# Patient Record
Sex: MALE | Race: BLACK OR AFRICAN AMERICAN | NOT HISPANIC OR LATINO | URBAN - METROPOLITAN AREA
[De-identification: names, ages, dates, MRNs, and addresses within clinical notes are randomized per-mention and may not be internally consistent; named-entity substitution may affect disease eponyms.]

---

## 2023-01-17 ENCOUNTER — OFFICE VISIT (OUTPATIENT)
Dept: FAMILY MEDICINE CLINIC | Facility: CLINIC | Age: 38
End: 2023-01-17

## 2023-01-17 VITALS
HEIGHT: 72 IN | OXYGEN SATURATION: 98 % | TEMPERATURE: 97.6 F | SYSTOLIC BLOOD PRESSURE: 124 MMHG | HEART RATE: 52 BPM | BODY MASS INDEX: 24.92 KG/M2 | WEIGHT: 184 LBS | DIASTOLIC BLOOD PRESSURE: 82 MMHG

## 2023-01-17 DIAGNOSIS — Z00.8 EXAM FOR POPULATION SURVEY: ICD-10-CM

## 2023-01-17 DIAGNOSIS — Z23 ENCOUNTER FOR IMMUNIZATION: ICD-10-CM

## 2023-01-17 DIAGNOSIS — M10.9 GOUT, UNSPECIFIED CAUSE, UNSPECIFIED CHRONICITY, UNSPECIFIED SITE: ICD-10-CM

## 2023-01-17 DIAGNOSIS — E78.00 HYPERCHOLESTEROLEMIA: ICD-10-CM

## 2023-01-17 DIAGNOSIS — Z00.00 ENCOUNTER FOR ANNUAL PHYSICAL EXAM: Primary | ICD-10-CM

## 2023-01-17 DIAGNOSIS — R53.83 FATIGUE, UNSPECIFIED TYPE: ICD-10-CM

## 2023-01-17 DIAGNOSIS — Z80.0 FAMILY HISTORY OF COLON CANCER IN MOTHER: ICD-10-CM

## 2023-01-17 NOTE — ASSESSMENT & PLAN NOTE
CPE done  Tdap given  Had J&J COVID vaccine  Declined flu shot  Will check labs and colo  Pt advised to follow a well balanced, heart healthy diet and to exercise on a regular basis  BP good  Not a smoker

## 2023-01-17 NOTE — PATIENT INSTRUCTIONS
Wellness Visit for Adults   AMBULATORY CARE:   A wellness visit  is when you see your healthcare provider to get screened for health problems  Your healthcare provider will also give you advice on how to stay healthy  Write down your questions so you remember to ask them  Ask your healthcare provider how often you should have a wellness visit  What happens at a wellness visit:  Your healthcare provider will ask about your health, and your family history of health problems  This includes high blood pressure, heart disease, and cancer  He or she will ask if you have symptoms that concern you, if you smoke, and about your mood  You may also be asked about your intake of medicines, supplements, food, and alcohol  Any of the following may be done: Your weight  will be checked  Your height may also be checked so your body mass index (BMI) can be calculated  Your BMI shows if you are at a healthy weight  Your blood pressure  and heart rate will be checked  Your temperature may also be checked  Blood and urine tests  may be done  Blood tests may be done to check your cholesterol levels  Abnormal cholesterol levels increase your risk for heart disease and stroke  You may also need a blood or urine test to check for diabetes if you are at increased risk  Urine tests may be done to look for signs of an infection or kidney disease  A physical exam  includes checking your heartbeat and lungs with a stethoscope  Your healthcare provider may also check your skin to look for sun damage  Screening tests  may be recommended  A screening test is done to check for diseases that may not cause symptoms  The screening tests you may need depend on your age, gender, family history, and lifestyle habits  For example, colorectal screening may be recommended if you are 48years old or older  Screening tests you need if you are a woman:   A Pap smear  is used to screen for cervical cancer   Pap smears are usually done every 3 to 5 years depending on your age  You may need them more often if you have had abnormal Pap smear test results in the past  Ask your healthcare provider how often you should have a Pap smear  A mammogram  is an x-ray of your breasts to screen for breast cancer  Experts recommend mammograms every 2 years starting at age 48 years  You may need a mammogram at age 52 years or younger if you have an increased risk for breast cancer  Talk to your healthcare provider about when you should start having mammograms and how often you need them  Vaccines you may need:   Get an influenza vaccine  every year  The influenza vaccine protects you from the flu  Several types of viruses cause the flu  The viruses change over time, so new vaccines are made each year  Get a tetanus-diphtheria (Td) booster vaccine  every 10 years  This vaccine protects you against tetanus and diphtheria  Tetanus is a severe infection that may cause painful muscle spasms and lockjaw  Diphtheria is a severe bacterial infection that causes a thick covering in the back of your mouth and throat  Get a human papillomavirus (HPV) vaccine  if you are female and aged 23 to 32 or male 23 to 24 and never received it  This vaccine protects you from HPV infection  HPV is the most common infection spread by sexual contact  HPV may also cause vaginal, penile, and anal cancers  Get a pneumococcal vaccine  if you are aged 72 years or older  The pneumococcal vaccine is an injection given to protect you from pneumococcal disease  Pneumococcal disease is an infection caused by pneumococcal bacteria  The infection may cause pneumonia, meningitis, or an ear infection  Get a shingles vaccine  if you are 60 or older, even if you have had shingles before  The shingles vaccine is an injection to protect you from the varicella-zoster virus  This is the same virus that causes chickenpox   Shingles is a painful rash that develops in people who had chickenpox or have been exposed to the virus  How to eat healthy:  My Plate is a model for planning healthy meals  It shows the types and amounts of foods that should go on your plate  Fruits and vegetables make up about half of your plate, and grains and protein make up the other half  A serving of dairy is included on the side of your plate  The amount of calories and serving sizes you need depends on your age, gender, weight, and height  Examples of healthy foods are listed below:  Eat a variety of vegetables  such as dark green, red, and orange vegetables  You can also include canned vegetables low in sodium (salt) and frozen vegetables without added butter or sauces  Eat a variety of fresh fruits , canned fruit in 100% juice, frozen fruit, and dried fruit  Include whole grains  At least half of the grains you eat should be whole grains  Examples include whole-wheat bread, wheat pasta, brown rice, and whole-grain cereals such as oatmeal     Eat a variety of protein foods such as seafood (fish and shellfish), lean meat, and poultry without skin (turkey and chicken)  Examples of lean meats include pork leg, shoulder, or tenderloin, and beef round, sirloin, tenderloin, and extra lean ground beef  Other protein foods include eggs and egg substitutes, beans, peas, soy products, nuts, and seeds  Choose low-fat dairy products such as skim or 1% milk or low-fat yogurt, cheese, and cottage cheese  Limit unhealthy fats  such as butter, hard margarine, and shortening  Exercise:  Exercise at least 30 minutes per day on most days of the week  Some examples of exercise include walking, biking, dancing, and swimming  You can also fit in more physical activity by taking the stairs instead of the elevator or parking farther away from stores  Include muscle strengthening activities 2 days each week  Regular exercise provides many health benefits   It helps you manage your weight, and decreases your risk for type 2 diabetes, heart disease, stroke, and high blood pressure  Exercise can also help improve your mood  Ask your healthcare provider about the best exercise plan for you  General health and safety guidelines:   Do not smoke  Nicotine and other chemicals in cigarettes and cigars can cause lung damage  Ask your healthcare provider for information if you currently smoke and need help to quit  E-cigarettes or smokeless tobacco still contain nicotine  Talk to your healthcare provider before you use these products  Limit alcohol  A drink of alcohol is 12 ounces of beer, 5 ounces of wine, or 1½ ounces of liquor  Lose weight, if needed  Being overweight increases your risk of certain health conditions  These include heart disease, high blood pressure, type 2 diabetes, and certain types of cancer  Protect your skin  Do not sunbathe or use tanning beds  Use sunscreen with a SPF 15 or higher  Apply sunscreen at least 15 minutes before you go outside  Reapply sunscreen every 2 hours  Wear protective clothing, hats, and sunglasses when you are outside  Drive safely  Always wear your seatbelt  Make sure everyone in your car wears a seatbelt  A seatbelt can save your life if you are in an accident  Do not use your cell phone when you are driving  This could distract you and cause an accident  Pull over if you need to make a call or send a text message  Practice safe sex  Use latex condoms if are sexually active and have more than one partner  Your healthcare provider may recommend screening tests for sexually transmitted infections (STIs)  Wear helmets, lifejackets, and protective gear  Always wear a helmet when you ride a bike or motorcycle, go skiing, or play sports that could cause a head injury  Wear protective equipment when you play sports  Wear a lifejacket when you are on a boat or doing water sports      © Copyright [employer] 2022 Information is for End User's use only and may not be sold, redistributed or otherwise used for commercial purposes  All illustrations and images included in CareNotes® are the copyrighted property of A D A M , Inc  or Mundo Murphy  The above information is an  only  It is not intended as medical advice for individual conditions or treatments  Talk to your doctor, nurse or pharmacist before following any medical regimen to see if it is safe and effective for you

## 2023-01-17 NOTE — PROGRESS NOTES
Depression Screening and Follow-up Plan: Patient was screened for depression during today's encounter  They screened negative with a PHQ-2 score of 0  Assessment/Plan:         Problem List Items Addressed This Visit        Other    Hypercholesterolemia     Recheck level  Relevant Orders    Lipid panel    Gout     No recent episodes  Will check uric acid level  Relevant Orders    Uric acid    Fatigue     Will check labs  Relevant Orders    CBC and differential    Comprehensive metabolic panel    Testosterone, free, total    Family history of colon cancer in mother     Will refer for colo  Relevant Orders    Ambulatory referral for colonoscopy    Encounter for annual physical exam - Primary     CPE done  Tdap given  Had J&J COVID vaccine  Declined flu shot  Will check labs and colo  Pt advised to follow a well balanced, heart healthy diet and to exercise on a regular basis  BP good  Not a smoker  Other Visit Diagnoses     Encounter for immunization        Relevant Orders    TDAP VACCINE GREATER THAN OR EQUAL TO 8YO IM    Exam for population survey        Relevant Orders    CBC and differential    Comprehensive metabolic panel    Lipid panel    Testosterone, free, total    Uric acid            Subjective:      Patient ID: Mary Skelton is a 44 y o  male  Pt here for physical  Has hx of HL and tgs  Pt also has hx of gout in the past and uric acid was high  No recent episodes and cut out red meat  Pt exercises  Not a smoker  No ETOH use  Unsure of last Tdap  Mom had colon CA and pt wants to get colo  Doesn't get flu shot  Had COVID vaccine J &J  Pt also feels tired a lot         The following portions of the patient's history were reviewed and updated as appropriate:   Past Medical History:  He has no past medical history on file ,  _______________________________________________________________________  Medical Problems:  does not have any pertinent problems on file ,  _______________________________________________________________________  Past Surgical History:   has a past surgical history that includes Anterior cruciate ligament repair  ,  _______________________________________________________________________  Family History:  family history includes Colon cancer in his mother; Hypertension in his mother and sister  ,  _______________________________________________________________________  Social History:   reports that he has never smoked  He has never used smokeless tobacco  He reports that he does not drink alcohol and does not use drugs  ,  _______________________________________________________________________  Allergies:  has No Known Allergies     _______________________________________________________________________  No current outpatient medications on file  No current facility-administered medications for this visit      _______________________________________________________________________  Review of Systems   Constitutional: Positive for fatigue  Negative for activity change, appetite change and unexpected weight change  HENT: Negative for congestion, ear pain, rhinorrhea, sore throat and trouble swallowing  Eyes: Negative for pain, discharge and visual disturbance  Respiratory: Negative for cough, shortness of breath and wheezing  Cardiovascular: Negative for chest pain, palpitations and leg swelling  Gastrointestinal: Negative for abdominal pain, constipation, diarrhea, nausea and vomiting  Endocrine: Negative for polydipsia, polyphagia and polyuria  Genitourinary: Negative for difficulty urinating and hematuria  Musculoskeletal: Negative for arthralgias, back pain, gait problem, myalgias and neck pain  Skin: Negative for color change and rash  Neurological: Negative for dizziness, weakness and headaches  Hematological: Negative for adenopathy  Does not bruise/bleed easily     Psychiatric/Behavioral: Negative for dysphoric mood and sleep disturbance  The patient is not nervous/anxious  Objective:  Vitals:    01/17/23 1426 01/17/23 1457   BP: 124/82    BP Location: Left arm    Patient Position: Sitting    Cuff Size: Standard    Pulse: (!) 52    Temp: 97 6 °F (36 4 °C)    TempSrc: Temporal    SpO2: 98%    Weight: 83 5 kg (184 lb)    Height:  6' (1 829 m)     Body mass index is 24 95 kg/m²  Physical Exam  Vitals and nursing note reviewed  Constitutional:       Appearance: Normal appearance  He is well-developed and normal weight  HENT:      Head: Normocephalic and atraumatic  Right Ear: Tympanic membrane, ear canal and external ear normal       Left Ear: Tympanic membrane, ear canal and external ear normal       Nose: Nose normal       Mouth/Throat:      Mouth: Mucous membranes are moist       Pharynx: Oropharynx is clear  No posterior oropharyngeal erythema  Eyes:      Conjunctiva/sclera: Conjunctivae normal       Pupils: Pupils are equal, round, and reactive to light  Neck:      Thyroid: No thyromegaly  Cardiovascular:      Rate and Rhythm: Normal rate and regular rhythm  Heart sounds: Normal heart sounds  No murmur heard  Pulmonary:      Effort: Pulmonary effort is normal       Breath sounds: Normal breath sounds  No wheezing or rales  Abdominal:      General: Bowel sounds are normal  There is no distension  Palpations: Abdomen is soft  There is no mass  Tenderness: There is no abdominal tenderness  Musculoskeletal:         General: No deformity  Normal range of motion  Cervical back: Normal range of motion and neck supple  Right lower leg: No edema  Left lower leg: No edema  Lymphadenopathy:      Cervical: No cervical adenopathy  Skin:     General: Skin is warm and dry  Capillary Refill: Capillary refill takes less than 2 seconds  Findings: No erythema or rash  Neurological:      General: No focal deficit present        Mental Status: He is alert and oriented to person, place, and time  Mental status is at baseline  Cranial Nerves: No cranial nerve deficit  Sensory: No sensory deficit  Motor: No abnormal muscle tone  Coordination: Coordination normal    Psychiatric:         Mood and Affect: Mood normal          Behavior: Behavior normal          Thought Content:  Thought content normal          Judgment: Judgment normal

## 2023-01-18 ENCOUNTER — APPOINTMENT (OUTPATIENT)
Dept: LAB | Age: 38
End: 2023-01-18

## 2023-01-18 DIAGNOSIS — M10.9 GOUT, UNSPECIFIED CAUSE, UNSPECIFIED CHRONICITY, UNSPECIFIED SITE: ICD-10-CM

## 2023-01-18 DIAGNOSIS — E78.00 HYPERCHOLESTEROLEMIA: ICD-10-CM

## 2023-01-18 DIAGNOSIS — R89.9 ABNORMAL LABORATORY TEST: Primary | ICD-10-CM

## 2023-01-18 DIAGNOSIS — R53.83 FATIGUE, UNSPECIFIED TYPE: ICD-10-CM

## 2023-01-18 DIAGNOSIS — Z00.8 EXAM FOR POPULATION SURVEY: ICD-10-CM

## 2023-01-18 LAB
ALBUMIN SERPL BCP-MCNC: 4.1 G/DL (ref 3.5–5)
ALP SERPL-CCNC: 69 U/L (ref 46–116)
ALT SERPL W P-5'-P-CCNC: 39 U/L (ref 12–78)
ANION GAP SERPL CALCULATED.3IONS-SCNC: 4 MMOL/L (ref 4–13)
AST SERPL W P-5'-P-CCNC: 34 U/L (ref 5–45)
BASOPHILS # BLD AUTO: 0.03 THOUSANDS/ÂΜL (ref 0–0.1)
BASOPHILS NFR BLD AUTO: 1 % (ref 0–1)
BILIRUB SERPL-MCNC: 0.5 MG/DL (ref 0.2–1)
BUN SERPL-MCNC: 16 MG/DL (ref 5–25)
CALCIUM SERPL-MCNC: 9.5 MG/DL (ref 8.3–10.1)
CHLORIDE SERPL-SCNC: 105 MMOL/L (ref 96–108)
CHOLEST SERPL-MCNC: 199 MG/DL
CO2 SERPL-SCNC: 29 MMOL/L (ref 21–32)
CREAT SERPL-MCNC: 1.04 MG/DL (ref 0.6–1.3)
EOSINOPHIL # BLD AUTO: 0.08 THOUSAND/ÂΜL (ref 0–0.61)
EOSINOPHIL NFR BLD AUTO: 2 % (ref 0–6)
ERYTHROCYTE [DISTWIDTH] IN BLOOD BY AUTOMATED COUNT: 12.3 % (ref 11.6–15.1)
GFR SERPL CREATININE-BSD FRML MDRD: 90 ML/MIN/1.73SQ M
GLUCOSE P FAST SERPL-MCNC: 81 MG/DL (ref 65–99)
HCT VFR BLD AUTO: 43.9 % (ref 36.5–49.3)
HDLC SERPL-MCNC: 43 MG/DL
HGB BLD-MCNC: 14.7 G/DL (ref 12–17)
IMM GRANULOCYTES # BLD AUTO: 0.01 THOUSAND/UL (ref 0–0.2)
IMM GRANULOCYTES NFR BLD AUTO: 0 % (ref 0–2)
LDLC SERPL CALC-MCNC: 132 MG/DL (ref 0–100)
LYMPHOCYTES # BLD AUTO: 1.61 THOUSANDS/ÂΜL (ref 0.6–4.47)
LYMPHOCYTES NFR BLD AUTO: 49 % (ref 14–44)
MCH RBC QN AUTO: 31.4 PG (ref 26.8–34.3)
MCHC RBC AUTO-ENTMCNC: 33.5 G/DL (ref 31.4–37.4)
MCV RBC AUTO: 94 FL (ref 82–98)
MONOCYTES # BLD AUTO: 0.42 THOUSAND/ÂΜL (ref 0.17–1.22)
MONOCYTES NFR BLD AUTO: 13 % (ref 4–12)
NEUTROPHILS # BLD AUTO: 1.17 THOUSANDS/ÂΜL (ref 1.85–7.62)
NEUTS SEG NFR BLD AUTO: 35 % (ref 43–75)
NONHDLC SERPL-MCNC: 156 MG/DL
NRBC BLD AUTO-RTO: 0 /100 WBCS
PLATELET # BLD AUTO: 160 THOUSANDS/UL (ref 149–390)
PMV BLD AUTO: 13 FL (ref 8.9–12.7)
POTASSIUM SERPL-SCNC: 4.4 MMOL/L (ref 3.5–5.3)
PROT SERPL-MCNC: 7.7 G/DL (ref 6.4–8.4)
RBC # BLD AUTO: 4.68 MILLION/UL (ref 3.88–5.62)
SODIUM SERPL-SCNC: 138 MMOL/L (ref 135–147)
TRIGL SERPL-MCNC: 121 MG/DL
URATE SERPL-MCNC: 6.5 MG/DL (ref 3.5–8.5)
WBC # BLD AUTO: 3.32 THOUSAND/UL (ref 4.31–10.16)

## 2023-01-19 LAB
TESTOST FREE SERPL-MCNC: 10.5 PG/ML (ref 8.7–25.1)
TESTOST SERPL-MCNC: 553 NG/DL (ref 264–916)

## 2023-02-01 ENCOUNTER — TELEPHONE (OUTPATIENT)
Dept: GASTROENTEROLOGY | Facility: CLINIC | Age: 38
End: 2023-02-01

## 2023-02-01 ENCOUNTER — PREP FOR PROCEDURE (OUTPATIENT)
Dept: GASTROENTEROLOGY | Facility: CLINIC | Age: 38
End: 2023-02-01

## 2023-02-01 DIAGNOSIS — Z12.11 COLON CANCER SCREENING: Primary | ICD-10-CM

## 2023-02-01 DIAGNOSIS — Z12.11 COLON CANCER SCREENING: ICD-10-CM

## 2023-02-01 DIAGNOSIS — Z80.0 FAMILY HISTORY OF COLON CANCER IN MOTHER: Primary | ICD-10-CM

## 2023-02-01 NOTE — TELEPHONE ENCOUNTER
02/01/23  Screened by: Estrella Shields MA    Referring Provider Dr Barnett      Pre- Screening: Body mass index is 24 95 kg/m²  Has patient been referred for a routine screening Colonoscopy? yes  Is the patient between 39-70 years old? no      Previous Colonoscopy no   If yes:    Date:     Facility:     Reason:       SCHEDULING STAFF: If the patient is between 45yrs-49yrs, please advise patient to confirm benefits/coverage with their insurance company for a routine screening colonoscopy, some insurance carriers will only cover at [address] or older  If the patient is over 66years old, please schedule an office visit  Does the patient want to see a Gastroenterologist prior to their procedure OR are they having any GI symptoms? no    Has the patient been hospitalized or had abdominal surgery in the past 6 months? no    Does the patient use supplemental oxygen? no    Does the patient take Coumadin, Lovenox, Plavix, Elliquis, Xarelto, or other blood thinning medication? no    Has the patient had a stroke, cardiac event, or stent placed in the past year? no    SCHEDULING STAFF: If patient answers NO to above questions, then schedule procedure  If patient answers YES to above questions, then schedule office appointment  If patient is between 45yrs - 49yrs, please advise patient that we will have to confirm benefits & coverage with their insurance company for a routine screening colonoscopy          Scheduled date of colonoscopy (as of today): 2/17/23  Physician performing colonoscopy: Eliz Sheehan  Location of colonoscopy: [facility] [address] [place] [address]  Bowel prep reviewed with patient: Golytely  Instructions reviewed with patient by: Nancy/felipe  Clearances: N/A

## 2023-02-16 ENCOUNTER — TELEPHONE (OUTPATIENT)
Dept: GASTROENTEROLOGY | Facility: AMBULARY SURGERY CENTER | Age: 38
End: 2023-02-16

## 2023-02-16 NOTE — TELEPHONE ENCOUNTER
Pt would like to reschedule, call back # [phone]      Scheduled date of colonoscopy (as of today): 2/17/23  Physician performing colonoscopy: Noa Dennis  Location of colonoscopy: [facility] [address] [place] [address]  Bowel prep reviewed with patient: Golytely  Instructions reviewed with patient by: Nancy/felipe  Clearances: N/A

## 2023-02-23 ENCOUNTER — ANESTHESIA EVENT (OUTPATIENT)
Dept: ANESTHESIOLOGY | Facility: AMBULATORY SURGERY CENTER | Age: 38
End: 2023-02-23

## 2023-02-23 ENCOUNTER — ANESTHESIA (OUTPATIENT)
Dept: ANESTHESIOLOGY | Facility: AMBULATORY SURGERY CENTER | Age: 38
End: 2023-02-23

## 2023-02-27 ENCOUNTER — TELEPHONE (OUTPATIENT)
Dept: GASTROENTEROLOGY | Facility: CLINIC | Age: 38
End: 2023-02-27

## 2023-02-27 NOTE — TELEPHONE ENCOUNTER
lmom confirming pt's colonoscopy scheduled on 3/9/23 at [facility] with Dr Jl Collazo  Informed [facility] would be calling 1-2 days prior with the arrival time  Informed of clear liquid diet day prior as well as the bowel cleansing preparation  Informed would need a  the day of the procedure due to being under sedation  I informed pt that I would be emailing him the instructions for the Miralax w/ dulcolax prep which is all otc  I asked pt to please call back if does not receive or if has any questions

## 2023-03-14 ENCOUNTER — TELEPHONE (OUTPATIENT)
Dept: GASTROENTEROLOGY | Facility: AMBULATORY SURGERY CENTER | Age: 38
End: 2023-03-14

## 2023-03-14 NOTE — TELEPHONE ENCOUNTER
Patient reports he missed a call from the office but called to confirm he will be there at 1000 for his procedure tomorrow

## 2023-03-15 ENCOUNTER — HOSPITAL ENCOUNTER (OUTPATIENT)
Dept: GASTROENTEROLOGY | Facility: AMBULATORY SURGERY CENTER | Age: 38
Discharge: HOME | End: 2023-03-15
Attending: STUDENT IN AN ORGANIZED HEALTH CARE EDUCATION/TRAINING PROGRAM

## 2023-03-15 ENCOUNTER — ANESTHESIA (OUTPATIENT)
Dept: GASTROENTEROLOGY | Facility: AMBULATORY SURGERY CENTER | Age: 38
End: 2023-03-15

## 2023-03-15 ENCOUNTER — ANESTHESIA EVENT (OUTPATIENT)
Dept: GASTROENTEROLOGY | Facility: AMBULATORY SURGERY CENTER | Age: 38
End: 2023-03-15

## 2023-03-15 VITALS
SYSTOLIC BLOOD PRESSURE: 128 MMHG | OXYGEN SATURATION: 100 % | BODY MASS INDEX: 24.92 KG/M2 | TEMPERATURE: 97.3 F | RESPIRATION RATE: 18 BRPM | DIASTOLIC BLOOD PRESSURE: 83 MMHG | HEART RATE: 57 BPM | HEIGHT: 72 IN | WEIGHT: 184 LBS

## 2023-03-15 DIAGNOSIS — Z12.11 COLON CANCER SCREENING: ICD-10-CM

## 2023-03-15 RX ORDER — PROPOFOL 10 MG/ML
INJECTION, EMULSION INTRAVENOUS AS NEEDED
Status: DISCONTINUED | OUTPATIENT
Start: 2023-03-15 | End: 2023-03-15

## 2023-03-15 RX ORDER — SODIUM CHLORIDE, SODIUM LACTATE, POTASSIUM CHLORIDE, CALCIUM CHLORIDE 600; 310; 30; 20 MG/100ML; MG/100ML; MG/100ML; MG/100ML
INJECTION, SOLUTION INTRAVENOUS CONTINUOUS PRN
Status: DISCONTINUED | OUTPATIENT
Start: 2023-03-15 | End: 2023-03-15

## 2023-03-15 RX ADMIN — PROPOFOL 30 MG: 10 INJECTION, EMULSION INTRAVENOUS at 11:09

## 2023-03-15 RX ADMIN — PROPOFOL 30 MG: 10 INJECTION, EMULSION INTRAVENOUS at 11:16

## 2023-03-15 RX ADMIN — PROPOFOL 30 MG: 10 INJECTION, EMULSION INTRAVENOUS at 11:18

## 2023-03-15 RX ADMIN — PROPOFOL 50 MG: 10 INJECTION, EMULSION INTRAVENOUS at 10:59

## 2023-03-15 RX ADMIN — PROPOFOL 30 MG: 10 INJECTION, EMULSION INTRAVENOUS at 11:07

## 2023-03-15 RX ADMIN — PROPOFOL 30 MG: 10 INJECTION, EMULSION INTRAVENOUS at 11:04

## 2023-03-15 RX ADMIN — SODIUM CHLORIDE, SODIUM LACTATE, POTASSIUM CHLORIDE, CALCIUM CHLORIDE: 600; 310; 30; 20 INJECTION, SOLUTION INTRAVENOUS at 10:54

## 2023-03-15 RX ADMIN — PROPOFOL 30 MG: 10 INJECTION, EMULSION INTRAVENOUS at 11:20

## 2023-03-15 RX ADMIN — PROPOFOL 30 MG: 10 INJECTION, EMULSION INTRAVENOUS at 11:01

## 2023-03-15 RX ADMIN — PROPOFOL 150 MG: 10 INJECTION, EMULSION INTRAVENOUS at 10:57

## 2023-03-15 RX ADMIN — PROPOFOL 30 MG: 10 INJECTION, EMULSION INTRAVENOUS at 11:13

## 2023-03-15 NOTE — H&P
History and Physical -  [facility]  Kurtis Mariano 44 y o  male MRN: [id number]                  HPI: Kurtis Mariano is a 44y o  year old male who presents for screening colonoscopy, family history of colon cancer, high risk      REVIEW OF SYSTEMS: Per the HPI, and otherwise unremarkable  Historical Information   History reviewed  No pertinent past medical history  Past Surgical History:   Procedure Laterality Date   • ANTERIOR CRUCIATE LIGAMENT REPAIR Right      Social History   Social History     Substance and Sexual Activity   Alcohol Use Never     Social History     Substance and Sexual Activity   Drug Use Never     Social History     Tobacco Use   Smoking Status Never   Smokeless Tobacco Never     Family History   Problem Relation Age of Onset   • Colon cancer Mother    • Hypertension Mother    • Hypertension Sister        Meds/Allergies     (Not in a hospital admission)      No Known Allergies    Objective     /59   Pulse (!) 53   Temp (!) 97 3 °F (36 3 °C) (Skin)   Resp 18   Ht 6' (1 829 m)   Wt 83 5 kg (184 lb)   SpO2 99%   BMI 24 95 kg/m²       PHYSICAL EXAM    Gen: NAD  CV: RRR  CHEST: Clear  ABD: soft, NT/ND  EXT: no edema      ASSESSMENT/PLAN:  This is a 44y o  year old male here for colonoscopy, and he is stable and optimized for his procedure

## 2023-03-15 NOTE — ANESTHESIA PREPROCEDURE EVALUATION
Procedure:  COLONOSCOPY    Relevant Problems   CARDIO   (+) Hypercholesterolemia      MUSCULOSKELETAL   (+) Gout      Other   (+) Encounter for annual physical exam   (+) Family history of colon cancer in mother   (+) Fatigue      Lab Results   Component Value Date    SODIUM 138 01/18/2023    K 4 4 01/18/2023     01/18/2023    CO2 29 01/18/2023    AGAP 4 01/18/2023    BUN 16 01/18/2023    CREATININE 1 04 01/18/2023    GLUF 81 01/18/2023    CALCIUM 9 5 01/18/2023    AST 34 01/18/2023    ALT 39 01/18/2023    ALKPHOS 69 01/18/2023    TP 7 7 01/18/2023    TBILI 0 50 01/18/2023    EGFR 90 01/18/2023     Lab Results   Component Value Date    WBC 3 32 (L) 01/18/2023    HGB 14 7 01/18/2023    HCT 43 9 01/18/2023    MCV 94 01/18/2023     01/18/2023         Physical Exam    Airway    Mallampati score: I  TM Distance: >3 FB  Neck ROM: full     Dental   No notable dental hx     Cardiovascular      Pulmonary      Other Findings        Anesthesia Plan  ASA Score- 2     Anesthesia Type- IV sedation with anesthesia with ASA Monitors  Additional Monitors:   Airway Plan:           Plan Factors-Exercise tolerance (METS): >4 METS  Chart reviewed  EKG reviewed  Imaging results reviewed  Existing labs reviewed  Patient summary reviewed  Induction- intravenous  Postoperative Plan-     Informed Consent- Anesthetic plan and risks discussed with patient  I personally reviewed this patient with the CRNA  Discussed and agreed on the Anesthesia Plan with the CRNA  Chen Moore

## 2023-03-15 NOTE — ANESTHESIA POSTPROCEDURE EVALUATION
Post-Op Assessment Note    CV Status:  Stable  Pain Score: 0    Pain management: adequate     Mental Status:  Sleepy and arousable   Hydration Status:  Stable   PONV Controlled:  None   Airway Patency:  Patent      Post Op Vitals Reviewed: Yes      Staff: CRNA         No notable events documented      BP  104/55    Temp     Pulse 60   Resp 17   SpO2 100

## 2023-10-03 ENCOUNTER — OFFICE VISIT (OUTPATIENT)
Dept: FAMILY MEDICINE CLINIC | Facility: CLINIC | Age: 38
End: 2023-10-03
Payer: COMMERCIAL

## 2023-10-03 VITALS
SYSTOLIC BLOOD PRESSURE: 120 MMHG | OXYGEN SATURATION: 99 % | HEART RATE: 50 BPM | HEIGHT: 72 IN | RESPIRATION RATE: 18 BRPM | TEMPERATURE: 96.3 F | BODY MASS INDEX: 23.03 KG/M2 | DIASTOLIC BLOOD PRESSURE: 78 MMHG | WEIGHT: 170 LBS

## 2023-10-03 DIAGNOSIS — M10.071 IDIOPATHIC GOUT OF RIGHT FOOT, UNSPECIFIED CHRONICITY: Primary | ICD-10-CM

## 2023-10-03 DIAGNOSIS — D72.820 LYMPHOCYTOSIS: ICD-10-CM

## 2023-10-03 DIAGNOSIS — M10.072 ACUTE IDIOPATHIC GOUT INVOLVING TOE OF LEFT FOOT: ICD-10-CM

## 2023-10-03 DIAGNOSIS — E78.00 ELEVATED LDL CHOLESTEROL LEVEL: ICD-10-CM

## 2023-10-03 DIAGNOSIS — D72.819 LEUKOPENIA, UNSPECIFIED TYPE: ICD-10-CM

## 2023-10-03 PROCEDURE — 99214 OFFICE O/P EST MOD 30 MIN: CPT | Performed by: FAMILY MEDICINE

## 2023-10-03 RX ORDER — ALLOPURINOL 100 MG/1
100 TABLET ORAL DAILY
Qty: 30 TABLET | Refills: 2 | Status: SHIPPED | OUTPATIENT
Start: 2023-10-03

## 2023-10-06 ENCOUNTER — APPOINTMENT (OUTPATIENT)
Dept: LAB | Facility: CLINIC | Age: 38
End: 2023-10-06
Payer: COMMERCIAL

## 2023-10-06 DIAGNOSIS — M10.072 ACUTE IDIOPATHIC GOUT INVOLVING TOE OF LEFT FOOT: ICD-10-CM

## 2023-10-06 DIAGNOSIS — M10.071 IDIOPATHIC GOUT OF RIGHT FOOT, UNSPECIFIED CHRONICITY: ICD-10-CM

## 2023-10-06 LAB
ALBUMIN SERPL BCP-MCNC: 4.5 G/DL (ref 3.5–5)
ALP SERPL-CCNC: 71 U/L (ref 34–104)
ALT SERPL W P-5'-P-CCNC: 18 U/L (ref 7–52)
ANION GAP SERPL CALCULATED.3IONS-SCNC: 3 MMOL/L
AST SERPL W P-5'-P-CCNC: 27 U/L (ref 13–39)
BASOPHILS # BLD AUTO: 0.03 THOUSANDS/ÂΜL (ref 0–0.1)
BASOPHILS NFR BLD AUTO: 1 % (ref 0–1)
BILIRUB SERPL-MCNC: 0.63 MG/DL (ref 0.2–1)
BUN SERPL-MCNC: 11 MG/DL (ref 5–25)
CALCIUM SERPL-MCNC: 9.9 MG/DL (ref 8.4–10.2)
CHLORIDE SERPL-SCNC: 104 MMOL/L (ref 96–108)
CO2 SERPL-SCNC: 32 MMOL/L (ref 21–32)
CREAT SERPL-MCNC: 0.92 MG/DL (ref 0.6–1.3)
EOSINOPHIL # BLD AUTO: 0.15 THOUSAND/ÂΜL (ref 0–0.61)
EOSINOPHIL NFR BLD AUTO: 4 % (ref 0–6)
ERYTHROCYTE [DISTWIDTH] IN BLOOD BY AUTOMATED COUNT: 12.5 % (ref 11.6–15.1)
GFR SERPL CREATININE-BSD FRML MDRD: 105 ML/MIN/1.73SQ M
GLUCOSE P FAST SERPL-MCNC: 73 MG/DL (ref 65–99)
HCT VFR BLD AUTO: 46.8 % (ref 36.5–49.3)
HGB BLD-MCNC: 15.1 G/DL (ref 12–17)
IMM GRANULOCYTES # BLD AUTO: 0 THOUSAND/UL (ref 0–0.2)
IMM GRANULOCYTES NFR BLD AUTO: 0 % (ref 0–2)
LYMPHOCYTES # BLD AUTO: 2.1 THOUSANDS/ÂΜL (ref 0.6–4.47)
LYMPHOCYTES NFR BLD AUTO: 59 % (ref 14–44)
MCH RBC QN AUTO: 30.5 PG (ref 26.8–34.3)
MCHC RBC AUTO-ENTMCNC: 32.3 G/DL (ref 31.4–37.4)
MCV RBC AUTO: 95 FL (ref 82–98)
MONOCYTES # BLD AUTO: 0.39 THOUSAND/ÂΜL (ref 0.17–1.22)
MONOCYTES NFR BLD AUTO: 11 % (ref 4–12)
NEUTROPHILS # BLD AUTO: 0.88 THOUSANDS/ÂΜL (ref 1.85–7.62)
NEUTS SEG NFR BLD AUTO: 25 % (ref 43–75)
NRBC BLD AUTO-RTO: 0 /100 WBCS
PLATELET # BLD AUTO: 182 THOUSANDS/UL (ref 149–390)
PMV BLD AUTO: 13 FL (ref 8.9–12.7)
POTASSIUM SERPL-SCNC: 5.1 MMOL/L (ref 3.5–5.3)
PROT SERPL-MCNC: 7.9 G/DL (ref 6.4–8.4)
RBC # BLD AUTO: 4.95 MILLION/UL (ref 3.88–5.62)
SODIUM SERPL-SCNC: 139 MMOL/L (ref 135–147)
URATE SERPL-MCNC: 4.6 MG/DL (ref 3.5–8.5)
WBC # BLD AUTO: 3.55 THOUSAND/UL (ref 4.31–10.16)

## 2023-10-06 PROCEDURE — 36415 COLL VENOUS BLD VENIPUNCTURE: CPT

## 2023-10-06 PROCEDURE — 84550 ASSAY OF BLOOD/URIC ACID: CPT

## 2023-10-06 PROCEDURE — 80053 COMPREHEN METABOLIC PANEL: CPT

## 2023-10-06 PROCEDURE — 85025 COMPLETE CBC W/AUTO DIFF WBC: CPT

## 2023-10-09 DIAGNOSIS — D70.9 NEUTROPENIA, UNSPECIFIED TYPE (HCC): ICD-10-CM

## 2023-10-09 DIAGNOSIS — D72.819 LEUKOPENIA, UNSPECIFIED TYPE: ICD-10-CM

## 2023-10-09 DIAGNOSIS — D72.820 LYMPHOCYTOSIS: Primary | ICD-10-CM

## 2023-10-29 NOTE — PROGRESS NOTES
Tim Ramsey  1985  [facility]ATOLOGY [facility]COL[facility] SPECIALISTS [facility] [id number]    Chief Complaint   Patient presents with    Consult     History of present illness:   Tim Ramsey is a 16-year-old male with past medical history significant for gout which was previously controlled with indomethacin and ibuprofen. Patient notes following with his PCP and routine blood work noted leukopenia with neutropenia. He was noted to have WBC count of 3.32, ANC of 1.17 on 1/18/2023. Repeat labs done on 10/6/2023 note WBC count of 3.55 and ANC of 880. No prior labs available since pt does not follow with doctors prior to January 2023. Patient referred to hematology for further evaluation. Pt notes elevated uric acid levels since 21years old. Had gout flares since 8/2020. Stopped eating meat. Started having recurrent gouts since June 2023 in his left foot. Had another flare in August 2023. Had repeat testing with PCP in October 2023 and started on allopurinol. Pt notes improvement in his symptoms of gout, but continues to have pain in his left foot. Denies any fever, night sweats unexpected weight loss. Denies any frequent or recurrent infections. Denies any dental concerns or skin rash. Denies family history of hematologic abnormality. Denies tobacco and ETOH use. Review of systems:   Review of Systems   Constitutional:  Negative for chills, fatigue and fever. Eyes:  Negative for pain and visual disturbance. Respiratory:  Negative for cough and shortness of breath. Cardiovascular:  Negative for chest pain and palpitations. Gastrointestinal:  Negative for abdominal pain and vomiting. Genitourinary:  Negative for dysuria and hematuria. Musculoskeletal:  Negative for arthralgias and back pain. Gout flares   Skin:  Negative for color change and rash. Neurological:  Negative for seizures and syncope.    All other systems reviewed and are negative.     Patient Active Problem List   Diagnosis    Encounter for annual physical exam    Family history of colon cancer in mother    Fatigue    Gout    Elevated LDL cholesterol level    Tear of meniscus of knee     Past Medical History:   Diagnosis Date    Arthritis     Gout     Past Surgical History:   Procedure Laterality Date    ANTERIOR CRUCIATE LIGAMENT REPAIR Right      Family History   Problem Relation Age of Onset    Colon cancer Mother     Hypertension Mother     Cancer Mother     Hypertension Sister      Social History     Socioeconomic History    Marital status: /Civil Union     Spouse name: Not on file    Number of children: Not on file    Years of education: Not on file    Highest education level: Not on file   Occupational History    Not on file   Tobacco Use    Smoking status: Never    Smokeless tobacco: Never   Vaping Use    Vaping Use: Never used   Substance and Sexual Activity    Alcohol use: Never    Drug use: Never    Sexual activity: Not Currently     Partners: Female     Birth control/protection: Condom Male   Other Topics Concern    Not on file   Social History Narrative    Not on file     Social Determinants of Health     Financial Resource Strain: Not on file   Food Insecurity: Not on file   Transportation Needs: Not on file   Physical Activity: Not on file   Stress: Not on file   Social Connections: Not on file   Intimate Partner Violence: Not on file   Housing Stability: Not on file       Current Outpatient Medications:     allopurinol (ZYLOPRIM) 100 mg tablet, Take 1 tablet (100 mg total) by mouth daily, Disp: 30 tablet, Rfl: 2    B Complex Vitamins (B COMPLEX 1 PO), Take by mouth With Vitamin C, Disp: , Rfl:     Cholecalciferol (VITAMIN D3 PO), Take by mouth in the morning, Disp: , Rfl:     Cyanocobalamin (VITAMIN B 12 PO), Take by mouth in the morning, Disp: , Rfl:     NON FORMULARY, in the morning Tarry rosario, Disp: , Rfl:     Turmeric (QC TUMERIC COMPLEX PO), Take by mouth daily, Disp: , Rfl:   No Known Allergies    Vitals:    10/30/23 0850   BP: 116/76   Pulse: 55   Resp: 16   Temp: (!) 97.2 °F (36.2 °C)   SpO2: 100%     Wt Readings from Last 3 Encounters:   10/30/23 77.6 kg (171 lb)   10/03/23 77.1 kg (170 lb)   03/15/23 83.5 kg (184 lb)     Physical Exam  Vitals reviewed. Constitutional:       General: He is not in acute distress. Appearance: Normal appearance. HENT:      Head: Normocephalic and atraumatic. Mouth/Throat:      Mouth: Mucous membranes are moist.   Eyes:      Extraocular Movements: Extraocular movements intact. Conjunctiva/sclera: Conjunctivae normal.      Comments: +eyeglasses   Cardiovascular:      Rate and Rhythm: Normal rate. Pulmonary:      Effort: Pulmonary effort is normal. No respiratory distress. Breath sounds: No wheezing, rhonchi or rales. Abdominal:      General: Abdomen is flat. There is no distension. Palpations: Abdomen is soft. Musculoskeletal:      Cervical back: Normal range of motion. Right lower leg: No edema. Left lower leg: No edema. Skin:     General: Skin is warm. Coloration: Skin is not jaundiced. Neurological:      General: No focal deficit present. Mental Status: He is alert and oriented to person, place, and time. Mental status is at baseline. Gait: Gait normal.   Psychiatric:         Thought Content: Thought content normal.     Labs:  1/18/2023: WBC: 3.32, H/H: 14.7/43.9, MCV: 94, platelets: 810. ANC: 1.17. Creatinine: 1.04. AST/ALT: 30/39. Total protein: 7.7. Calcium: 9.5    10/6/2023: WBC: 3.55, H/H: 15.1/46.8, MCV: 95, platelets: 635. ANC: 880. Creatinine: 0.92. AST/ALT: 27/18. Total protein: 7.9. Calcium: 9.9    Assessment/plan:         1. Leukopenia with neutropenia    Kim Watson is a 42-year-old male with past medical history significant for gout which was previously controlled with indomethacin and ibuprofen.   Patient notes following with his PCP and routine blood work noted leukopenia with neutropenia. He was noted to have WBC count of 3.32, ANC of 1.17 on 1/18/2023. Repeat labs done on 10/6/2023 note WBC count of 3.55 and ANC of 880. No prior labs available since pt does not follow with doctors prior to January 2023. Denies any fever, night sweats unexpected weight loss. Denies any frequent or recurrent infections. I met with the patient in the office and reviewed his history, chart, labs and exam.  We discussed the causes of leukopenia including but not limited to vitamin deficiency, infections or underlying bone marrow disorder. At this juncture, we will repeat CBC, check vitamin B12/folate levels, check hepatitis panel/HIV and flow cytometry. Further hematologic recommendations pending previously mentioned blood work. If improvement noted in leukopenia and no other cytopenias then we will continue monitoring and he will repeat labs in 3 months. Orders Placed This Encounter   Procedures    CBC and differential    Vitamin B12    Folate    Methylmalonic acid, serum    HIV 1/2 AG/AB W REFLEX [facility] and [facility] only    Hepatitis Panel, General    Leukemia/Lymphoma flow cytometry    CBC and differential     Return in about 3 months (around 1/30/2024) for Office Visit, Labs - See Treatment Plan. 1. Check cbc, b12/folate, methylmalonic acid, hiv/hep panel, flow cytometry now.    2. FU in 3 months with CBC prior

## 2023-10-30 ENCOUNTER — CONSULT (OUTPATIENT)
Dept: HEMATOLOGY ONCOLOGY | Facility: CLINIC | Age: 38
End: 2023-10-30
Payer: COMMERCIAL

## 2023-10-30 VITALS
WEIGHT: 171 LBS | RESPIRATION RATE: 16 BRPM | HEIGHT: 72 IN | HEART RATE: 55 BPM | SYSTOLIC BLOOD PRESSURE: 116 MMHG | BODY MASS INDEX: 23.16 KG/M2 | DIASTOLIC BLOOD PRESSURE: 76 MMHG | OXYGEN SATURATION: 100 % | TEMPERATURE: 97.2 F

## 2023-10-30 DIAGNOSIS — D72.820 LYMPHOCYTOSIS: ICD-10-CM

## 2023-10-30 DIAGNOSIS — D72.819 LEUKOPENIA, UNSPECIFIED TYPE: ICD-10-CM

## 2023-10-30 DIAGNOSIS — D70.9 NEUTROPENIA, UNSPECIFIED TYPE (HCC): ICD-10-CM

## 2023-10-30 PROCEDURE — 99204 OFFICE O/P NEW MOD 45 MIN: CPT | Performed by: INTERNAL MEDICINE

## 2023-11-06 ENCOUNTER — APPOINTMENT (OUTPATIENT)
Dept: LAB | Facility: CLINIC | Age: 38
End: 2023-11-06
Payer: COMMERCIAL

## 2023-11-06 DIAGNOSIS — D70.9 NEUTROPENIA, UNSPECIFIED TYPE (HCC): ICD-10-CM

## 2023-11-06 DIAGNOSIS — D72.819 LEUKOPENIA, UNSPECIFIED TYPE: ICD-10-CM

## 2023-11-06 LAB
BASOPHILS # BLD AUTO: 0.02 THOUSANDS/ÂΜL (ref 0–0.1)
BASOPHILS NFR BLD AUTO: 1 % (ref 0–1)
EOSINOPHIL # BLD AUTO: 0.16 THOUSAND/ÂΜL (ref 0–0.61)
EOSINOPHIL NFR BLD AUTO: 5 % (ref 0–6)
ERYTHROCYTE [DISTWIDTH] IN BLOOD BY AUTOMATED COUNT: 12.5 % (ref 11.6–15.1)
FOLATE SERPL-MCNC: 13 NG/ML
HAV IGM SER QL: NORMAL
HBV CORE IGM SER QL: NORMAL
HBV SURFACE AG SER QL: NORMAL
HCT VFR BLD AUTO: 48.6 % (ref 36.5–49.3)
HCV AB SER QL: NORMAL
HGB BLD-MCNC: 16.1 G/DL (ref 12–17)
IMM GRANULOCYTES # BLD AUTO: 0.01 THOUSAND/UL (ref 0–0.2)
IMM GRANULOCYTES NFR BLD AUTO: 0 % (ref 0–2)
LYMPHOCYTES # BLD AUTO: 1.75 THOUSANDS/ÂΜL (ref 0.6–4.47)
LYMPHOCYTES NFR BLD AUTO: 51 % (ref 14–44)
MCH RBC QN AUTO: 31.1 PG (ref 26.8–34.3)
MCHC RBC AUTO-ENTMCNC: 33.1 G/DL (ref 31.4–37.4)
MCV RBC AUTO: 94 FL (ref 82–98)
MONOCYTES # BLD AUTO: 0.27 THOUSAND/ÂΜL (ref 0.17–1.22)
MONOCYTES NFR BLD AUTO: 8 % (ref 4–12)
NEUTROPHILS # BLD AUTO: 1.16 THOUSANDS/ÂΜL (ref 1.85–7.62)
NEUTS SEG NFR BLD AUTO: 35 % (ref 43–75)
NRBC BLD AUTO-RTO: 0 /100 WBCS
PLATELET # BLD AUTO: 207 THOUSANDS/UL (ref 149–390)
PMV BLD AUTO: 12 FL (ref 8.9–12.7)
RBC # BLD AUTO: 5.17 MILLION/UL (ref 3.88–5.62)
VIT B12 SERPL-MCNC: 1326 PG/ML (ref 180–914)
WBC # BLD AUTO: 3.37 THOUSAND/UL (ref 4.31–10.16)

## 2023-11-06 PROCEDURE — 85025 COMPLETE CBC W/AUTO DIFF WBC: CPT

## 2023-11-06 PROCEDURE — 82746 ASSAY OF FOLIC ACID SERUM: CPT

## 2023-11-06 PROCEDURE — 36415 COLL VENOUS BLD VENIPUNCTURE: CPT

## 2023-11-06 PROCEDURE — 82607 VITAMIN B-12: CPT

## 2023-11-06 PROCEDURE — 83918 ORGANIC ACIDS TOTAL QUANT: CPT

## 2023-11-06 PROCEDURE — 80074 ACUTE HEPATITIS PANEL: CPT

## 2023-11-06 PROCEDURE — 87389 HIV-1 AG W/HIV-1&-2 AB AG IA: CPT

## 2023-11-06 PROCEDURE — 88184 FLOWCYTOMETRY/ TC 1 MARKER: CPT

## 2023-11-06 PROCEDURE — 88185 FLOWCYTOMETRY/TC ADD-ON: CPT

## 2023-11-07 LAB — HIV 1+2 AB+HIV1 P24 AG SERPL QL IA: NON REACTIVE

## 2023-11-08 ENCOUNTER — TELEPHONE (OUTPATIENT)
Age: 38
End: 2023-11-08

## 2023-11-08 LAB — SCAN RESULT: NORMAL

## 2023-11-08 NOTE — TELEPHONE ENCOUNTER
Marques's wife calling asking about results of his bloodwork. Hematology/oncology order but she wanted the PCP to look this over. Thank You. Please call back.

## 2023-11-10 LAB — METHYLMALONATE SERPL-SCNC: 101 NMOL/L (ref 0–378)

## 2023-11-13 ENCOUNTER — TELEPHONE (OUTPATIENT)
Dept: HEMATOLOGY ONCOLOGY | Facility: CLINIC | Age: 38
End: 2023-11-13

## 2023-11-13 DIAGNOSIS — D70.9 NEUTROPENIA, UNSPECIFIED TYPE (HCC): Primary | ICD-10-CM

## 2023-11-13 NOTE — TELEPHONE ENCOUNTER
Left message on patient's cell phone in regards to the blood work he had done on 11/6/23. Patient made aware that his Hepatitis and HIV panels were negative, folate and Vit b12 levels were adequate, and that his ANC level was back up to 1. 16. Patient was instructed to have a CBC redrawn a week prior to his next appt with Dr. Steve Silva on 1/30. Script placed in patient's chart.

## 2023-11-13 NOTE — TELEPHONE ENCOUNTER
----- Message from [place], [place] sent at 11/13/2023 10:04 AM EST -----  Please defy patient that his neutrophil count has increased. We will continue to monitor as previously discussed. Vitamin B12/folate levels are adequate. Infectious work-up with hepatitis/HIV was unremarkable.

## 2023-12-01 DIAGNOSIS — M10.071 IDIOPATHIC GOUT OF RIGHT FOOT, UNSPECIFIED CHRONICITY: ICD-10-CM

## 2023-12-01 RX ORDER — ALLOPURINOL 100 MG/1
100 TABLET ORAL DAILY
Qty: 90 TABLET | Refills: 1 | Status: SHIPPED | OUTPATIENT
Start: 2023-12-01

## 2023-12-06 ENCOUNTER — TELEPHONE (OUTPATIENT)
Age: 38
End: 2023-12-06

## 2023-12-06 NOTE — TELEPHONE ENCOUNTER
Patient wife Parth Ferrari called and informed that currently prescribed Gout medicine is not working for the patient and is requesting to prescribe the medicines Indomethacin & Colchicine which was used by the patient in past. And also mentioned change of address for the [facility] pharmacy at [address] [place] [address]. Please advise.  Thanks

## 2023-12-07 DIAGNOSIS — M10.9 GOUT, UNSPECIFIED CAUSE, UNSPECIFIED CHRONICITY, UNSPECIFIED SITE: Primary | ICD-10-CM

## 2023-12-07 RX ORDER — INDOMETHACIN 50 MG/1
50 CAPSULE ORAL
Qty: 20 CAPSULE | Refills: 0 | Status: SHIPPED | OUTPATIENT
Start: 2023-12-07

## 2023-12-07 RX ORDER — COLCHICINE 0.6 MG/1
0.6 TABLET ORAL DAILY
Qty: 20 TABLET | Refills: 0 | Status: SHIPPED | OUTPATIENT
Start: 2023-12-07

## 2023-12-07 NOTE — TELEPHONE ENCOUNTER
Ok for indomethacin 50 mg q 8 hours as needed for gout pain #20 and colchicine 0.6 mg daily as needed for gout pain #20. Patient should also schedule appointment for follow-up.

## 2023-12-14 PROBLEM — R53.83 FATIGUE: Status: RESOLVED | Noted: 2023-01-17 | Resolved: 2023-12-14

## 2023-12-14 PROBLEM — K63.5 COLON POLYPS: Status: ACTIVE | Noted: 2023-12-14

## 2023-12-14 PROBLEM — D70.9 NEUTROPENIA (HCC): Status: ACTIVE | Noted: 2023-12-14

## 2023-12-28 ENCOUNTER — TELEPHONE (OUTPATIENT)
Dept: HEMATOLOGY ONCOLOGY | Facility: CLINIC | Age: 38
End: 2023-12-28

## 2023-12-28 NOTE — TELEPHONE ENCOUNTER
I called [facility] regarding an appointment that they have scheduled with Dr. Guadarrama scheduled on  1/30/24      I left a voicemail explaining to patient that this appointment will need to be rescheduled due to a change in the providers schedule. Patient was advised to call \[facility] [place] [facility]\"" to reschedule.  Another attempt will be made to reschedule    Informed patient that Dr. Guadarrama will start seeing patients at the [place] office on 3/4/24.

## 2024-01-09 ENCOUNTER — TELEPHONE (OUTPATIENT)
Dept: HEMATOLOGY ONCOLOGY | Facility: CLINIC | Age: 39
End: 2024-01-09

## 2024-01-09 NOTE — TELEPHONE ENCOUNTER
I am reaching out regarding your appointment with Dr. Guadarrama on 1/30/24.    There has been a change to the providers schedule, and we will need to reschedule your appointment.    I left a voicemail explaining to patient that this appointment will need to be rescheduled due to a change in the providers schedule. Patient was advised to call [facility] [phone] to reschedule.

## 2024-01-17 ENCOUNTER — TELEPHONE (OUTPATIENT)
Dept: HEMATOLOGY ONCOLOGY | Facility: CLINIC | Age: 39
End: 2024-01-17

## 2024-01-17 NOTE — TELEPHONE ENCOUNTER
Appointment Change  Cancel, Reschedule, Change to Virtual      Who are you speaking with? Patient   If it is not the patient, is the caller listed on the communication consent form? Yes   Which provider is the appointment scheduled with? Dr. Guadarrama   When was the original appointment scheduled?    Please list date and time 1/30/24   At which location is the appointment scheduled to take place? [place]   Was the appointment rescheduled?     Was the appointment changed from an in person visit to a virtual visit?    If so, please list the details of the change. 3/7/24   What is the reason for the appointment change? provider

## 2024-02-20 ENCOUNTER — APPOINTMENT (OUTPATIENT)
Dept: LAB | Age: 39
End: 2024-02-20
Payer: COMMERCIAL

## 2024-02-20 DIAGNOSIS — D70.9 NEUTROPENIA, UNSPECIFIED TYPE (HCC): ICD-10-CM

## 2024-02-20 DIAGNOSIS — D72.819 LEUKOPENIA, UNSPECIFIED TYPE: ICD-10-CM

## 2024-02-20 LAB
BASOPHILS # BLD AUTO: 0.04 THOUSANDS/ÂΜL (ref 0–0.1)
BASOPHILS NFR BLD AUTO: 1 % (ref 0–1)
EOSINOPHIL # BLD AUTO: 0.13 THOUSAND/ÂΜL (ref 0–0.61)
EOSINOPHIL NFR BLD AUTO: 4 % (ref 0–6)
ERYTHROCYTE [DISTWIDTH] IN BLOOD BY AUTOMATED COUNT: 12.4 % (ref 11.6–15.1)
HCT VFR BLD AUTO: 48 % (ref 36.5–49.3)
HGB BLD-MCNC: 15.3 G/DL (ref 12–17)
IMM GRANULOCYTES # BLD AUTO: 0 THOUSAND/UL (ref 0–0.2)
IMM GRANULOCYTES NFR BLD AUTO: 0 % (ref 0–2)
LYMPHOCYTES # BLD AUTO: 1.68 THOUSANDS/ÂΜL (ref 0.6–4.47)
LYMPHOCYTES NFR BLD AUTO: 46 % (ref 14–44)
MCH RBC QN AUTO: 30 PG (ref 26.8–34.3)
MCHC RBC AUTO-ENTMCNC: 31.9 G/DL (ref 31.4–37.4)
MCV RBC AUTO: 94 FL (ref 82–98)
MONOCYTES # BLD AUTO: 0.47 THOUSAND/ÂΜL (ref 0.17–1.22)
MONOCYTES NFR BLD AUTO: 13 % (ref 4–12)
NEUTROPHILS # BLD AUTO: 1.28 THOUSANDS/ÂΜL (ref 1.85–7.62)
NEUTS SEG NFR BLD AUTO: 36 % (ref 43–75)
NRBC BLD AUTO-RTO: 0 /100 WBCS
PLATELET # BLD AUTO: 183 THOUSANDS/UL (ref 149–390)
PMV BLD AUTO: 12.7 FL (ref 8.9–12.7)
RBC # BLD AUTO: 5.1 MILLION/UL (ref 3.88–5.62)
WBC # BLD AUTO: 3.6 THOUSAND/UL (ref 4.31–10.16)

## 2024-02-20 PROCEDURE — 85025 COMPLETE CBC W/AUTO DIFF WBC: CPT

## 2024-02-20 PROCEDURE — 36415 COLL VENOUS BLD VENIPUNCTURE: CPT

## 2024-03-12 ENCOUNTER — OFFICE VISIT (OUTPATIENT)
Dept: FAMILY MEDICINE CLINIC | Facility: CLINIC | Age: 39
End: 2024-03-12
Payer: COMMERCIAL

## 2024-03-12 VITALS
BODY MASS INDEX: 23.57 KG/M2 | SYSTOLIC BLOOD PRESSURE: 122 MMHG | DIASTOLIC BLOOD PRESSURE: 80 MMHG | HEART RATE: 68 BPM | HEIGHT: 72 IN | OXYGEN SATURATION: 95 % | WEIGHT: 174 LBS | RESPIRATION RATE: 16 BRPM

## 2024-03-12 DIAGNOSIS — E78.00 ELEVATED LDL CHOLESTEROL LEVEL: ICD-10-CM

## 2024-03-12 DIAGNOSIS — M79.671 PAIN IN BOTH FEET: Primary | ICD-10-CM

## 2024-03-12 DIAGNOSIS — Z00.00 ENCOUNTER FOR ANNUAL PHYSICAL EXAM: ICD-10-CM

## 2024-03-12 DIAGNOSIS — M79.672 PAIN IN BOTH FEET: Primary | ICD-10-CM

## 2024-03-12 DIAGNOSIS — D70.9 NEUTROPENIA, UNSPECIFIED TYPE (HCC): ICD-10-CM

## 2024-03-12 PROCEDURE — 99395 PREV VISIT EST AGE 18-39: CPT | Performed by: FAMILY MEDICINE

## 2024-03-12 PROCEDURE — 99213 OFFICE O/P EST LOW 20 MIN: CPT | Performed by: FAMILY MEDICINE

## 2024-03-12 NOTE — PROGRESS NOTES
Depression Screening and Follow-up Plan: Patient was screened for depression during today's encounter. They screened negative with a PHQ-2 score of 0.    Assessment/Plan:         Problem List Items Addressed This Visit        Blood    Neutropenia (HCC)     Stable. Patient has seen Hematology. CBC done in February 2024 and stable.             Surgery/Wound/Pain    Pain in both feet - Primary     Patient has had it off and on for past year. Patient has been treated for gout , but still gets symptoms and I am not sure that his symptoms are actually gout related. Will refer to Podiatry for further evaluation and management.          Relevant Orders    Ambulatory Referral to Podiatry    Uric acid       Other    Encounter for annual physical exam     CPE done. Last Tdap was in 2023. Had J&J COVID vaccine. Declined flu shot. Labs are up to date. Pt advised to follow a well balanced, heart healthy diet and to exercise on a regular basis. Not a smoker. Blood pressure good.          Relevant Orders    Lipid panel    Elevated LDL cholesterol level     Recheck lipids. Advised pt to follow a low cholesterol diet and to exercise on a regular basis.          Relevant Orders    Lipid panel         Subjective:      Patient ID: Marques Romero is a 38 y.o. male.    Patient here for physical. Doing ok. No chest pain or shortness of breath. No headaches. No abdominal pain. Last Tdap was in 2023. Had COVID vaccine. Doesn't get flu shot. Patient gets pain in feet off and on. Being treated for gout with allopurinol and uric acid low but patient still gets pain off and on. Patient exercises. Eats healthy. Not a smoker. No ETOH use.         The following portions of the patient's history were reviewed and updated as appropriate:   Past Medical History:  He has a past medical history of Arthritis.,  _______________________________________________________________________  Medical Problems:  does not have any pertinent problems on  file.,  _______________________________________________________________________  Past Surgical History:   has a past surgical history that includes Anterior cruciate ligament repair (Right).,  _______________________________________________________________________  Family History:  family history includes Cancer in his mother; Colon cancer in his mother; Hypertension in his mother and sister.,  _______________________________________________________________________  Social History:   reports that he has never smoked. He has never used smokeless tobacco. He reports that he does not drink alcohol and does not use drugs.,  _______________________________________________________________________  Allergies:  has No Known Allergies..  _______________________________________________________________________  Current Outpatient Medications   Medication Sig Dispense Refill   • allopurinol (ZYLOPRIM) 100 mg tablet TAKE 1 TABLET BY MOUTH EVERY DAY 90 tablet 1   • B Complex Vitamins (B COMPLEX 1 PO) Take by mouth With Vitamin C     • Cholecalciferol (VITAMIN D3 PO) Take by mouth in the morning     • Cyanocobalamin (VITAMIN B 12 PO) Take by mouth in the morning     • Turmeric (QC TUMERIC COMPLEX PO) Take by mouth daily     • colchicine (COLCRYS) 0.6 mg tablet Take 1 tablet (0.6 mg total) by mouth daily (Patient not taking: Reported on 3/12/2024) 20 tablet 0   • indomethacin (INDOCIN) 50 mg capsule Take 1 capsule (50 mg total) by mouth 3 (three) times a day with meals (Patient not taking: Reported on 3/12/2024) 20 capsule 0     No current facility-administered medications for this visit.     _______________________________________________________________________  Review of Systems   Constitutional:  Negative for activity change, appetite change, fatigue and unexpected weight change.   HENT:  Negative for congestion, ear pain, rhinorrhea, sore throat and trouble swallowing.    Eyes:  Negative for pain, discharge and visual  disturbance.   Respiratory:  Negative for cough, shortness of breath and wheezing.    Cardiovascular:  Negative for chest pain, palpitations and leg swelling.   Gastrointestinal:  Negative for abdominal pain, constipation, diarrhea, nausea and vomiting.   Endocrine: Negative for polydipsia, polyphagia and polyuria.   Genitourinary:  Negative for difficulty urinating and hematuria.   Musculoskeletal:  Negative for arthralgias, back pain, gait problem, myalgias and neck pain.        Pain in both feet off and on.    Skin:  Negative for color change and rash.   Neurological:  Negative for dizziness, weakness and headaches.   Hematological:  Negative for adenopathy. Does not bruise/bleed easily.   Psychiatric/Behavioral:  Negative for dysphoric mood and sleep disturbance. The patient is not nervous/anxious.          Objective:  Vitals:    03/12/24 0824   BP: 122/80   BP Location: Left arm   Patient Position: Sitting   Cuff Size: Standard   Pulse: 68   Resp: 16   SpO2: 95%   Weight: 78.9 kg (174 lb)   Height: 6' (1.829 m)     Body mass index is 23.6 kg/m².     Physical Exam  Vitals and nursing note reviewed.   Constitutional:       Appearance: Normal appearance. He is well-developed and normal weight.   HENT:      Head: Normocephalic and atraumatic.      Right Ear: Tympanic membrane, ear canal and external ear normal.      Left Ear: Tympanic membrane, ear canal and external ear normal.      Nose: Nose normal.      Mouth/Throat:      Mouth: Mucous membranes are moist.      Pharynx: Oropharynx is clear. No posterior oropharyngeal erythema.   Eyes:      Conjunctiva/sclera: Conjunctivae normal.      Pupils: Pupils are equal, round, and reactive to light.   Neck:      Thyroid: No thyromegaly.   Cardiovascular:      Rate and Rhythm: Normal rate and regular rhythm.      Heart sounds: Normal heart sounds. No murmur heard.  Pulmonary:      Effort: Pulmonary effort is normal.      Breath sounds: Normal breath sounds. No wheezing or  rales.   Abdominal:      General: Bowel sounds are normal. There is no distension.      Palpations: Abdomen is soft. There is no mass.      Tenderness: There is no abdominal tenderness.   Musculoskeletal:         General: No deformity. Normal range of motion.      Cervical back: Normal range of motion and neck supple.      Right lower leg: No edema.      Left lower leg: No edema.   Lymphadenopathy:      Cervical: No cervical adenopathy.   Skin:     General: Skin is warm and dry.      Capillary Refill: Capillary refill takes less than 2 seconds.      Findings: No erythema or rash.   Neurological:      General: No focal deficit present.      Mental Status: He is alert and oriented to person, place, and time. Mental status is at baseline.      Cranial Nerves: No cranial nerve deficit.      Sensory: No sensory deficit.      Motor: No abnormal muscle tone.      Coordination: Coordination normal.   Psychiatric:         Mood and Affect: Mood normal.         Behavior: Behavior normal.         Thought Content: Thought content normal.         Judgment: Judgment normal.

## 2024-03-12 NOTE — ASSESSMENT & PLAN NOTE
CPE done. Last Tdap was in 2023. Had J&J COVID vaccine. Declined flu shot. Labs are up to date. Pt advised to follow a well balanced, heart healthy diet and to exercise on a regular basis. Not a smoker. Blood pressure good.

## 2024-03-12 NOTE — ASSESSMENT & PLAN NOTE
Patient has had it off and on for past year. Patient has been treated for gout , but still gets symptoms and I am not sure that his symptoms are actually gout related. Will refer to Podiatry for further evaluation and management.

## 2024-03-13 ENCOUNTER — APPOINTMENT (OUTPATIENT)
Dept: LAB | Facility: CLINIC | Age: 39
End: 2024-03-13
Payer: COMMERCIAL

## 2024-03-13 DIAGNOSIS — M79.671 PAIN IN BOTH FEET: ICD-10-CM

## 2024-03-13 DIAGNOSIS — E78.00 ELEVATED LDL CHOLESTEROL LEVEL: ICD-10-CM

## 2024-03-13 DIAGNOSIS — M79.672 PAIN IN BOTH FEET: ICD-10-CM

## 2024-03-13 DIAGNOSIS — Z00.00 ENCOUNTER FOR ANNUAL PHYSICAL EXAM: ICD-10-CM

## 2024-03-13 LAB
CHOLEST SERPL-MCNC: 189 MG/DL
HDLC SERPL-MCNC: 52 MG/DL
LDLC SERPL CALC-MCNC: 120 MG/DL (ref 0–100)
NONHDLC SERPL-MCNC: 137 MG/DL
TRIGL SERPL-MCNC: 84 MG/DL
URATE SERPL-MCNC: 4.9 MG/DL (ref 3.5–8.5)

## 2024-03-13 PROCEDURE — 80061 LIPID PANEL: CPT

## 2024-03-13 PROCEDURE — 36415 COLL VENOUS BLD VENIPUNCTURE: CPT

## 2024-03-13 PROCEDURE — 84550 ASSAY OF BLOOD/URIC ACID: CPT

## 2024-04-10 ENCOUNTER — HOSPITAL ENCOUNTER (OUTPATIENT)
Dept: RADIOLOGY | Facility: HOSPITAL | Age: 39
Discharge: HOME | End: 2024-04-10
Attending: PODIATRIST
Payer: COMMERCIAL

## 2024-04-10 ENCOUNTER — OFFICE VISIT (OUTPATIENT)
Dept: PODIATRY | Facility: CLINIC | Age: 39
End: 2024-04-10
Payer: COMMERCIAL

## 2024-04-10 VITALS
BODY MASS INDEX: 23.16 KG/M2 | WEIGHT: 171 LBS | SYSTOLIC BLOOD PRESSURE: 131 MMHG | HEIGHT: 72 IN | OXYGEN SATURATION: 100 % | HEART RATE: 63 BPM | DIASTOLIC BLOOD PRESSURE: 90 MMHG

## 2024-04-10 DIAGNOSIS — M79.671 PAIN IN BOTH FEET: ICD-10-CM

## 2024-04-10 DIAGNOSIS — M79.672 PAIN IN BOTH FEET: ICD-10-CM

## 2024-04-10 DIAGNOSIS — M20.5X9 HALLUX LIMITUS, ACQUIRED, UNSPECIFIED LATERALITY: Primary | ICD-10-CM

## 2024-04-10 DIAGNOSIS — M20.5X9 HALLUX LIMITUS, ACQUIRED, UNSPECIFIED LATERALITY: ICD-10-CM

## 2024-04-10 PROCEDURE — 99203 OFFICE O/P NEW LOW 30 MIN: CPT | Performed by: PODIATRIST

## 2024-04-10 PROCEDURE — 73630 X-RAY EXAM OF FOOT: CPT

## 2024-04-10 NOTE — PROGRESS NOTES
Assessment/Plan:     The patient's clinical examination today significant for passive range of motion of the great toe joints bilaterally.  Passive range of motion is limited to approximately 20 to 25 degrees of dorsiflexion.  There is no active erythema nor edema no counter ecchymosis to either foot today.  Pulses are palpable bilaterally.  Osseous spurring is also noted with palpation of the bilateral great toe joints.    X-rays of both feet taken today were personally viewed interpreted.  Also no signs of fracture or dislocation to either lower extremity.  Some early arthritic changes noted to the first MTPJ's bilaterally.    X-rays were with the patient in detail.  His current great toe joint pain and forefoot discomfort is most likely attributed to hallux limitus with occasional bouts of transfer metatarsalgia.  We discussed the importance of proper accommodative shoe gear.  He could potentially benefit from good OTC arch supports and some recommendations were attached to his AVS.  We also need to focus on improving range of motion noted only in the great toe joint in the ankle joint as well.  Ultimately, if his symptoms worsen we can consider cheilectomy or plantarflexory osteotomy of the first metatarsal to improve range of motion of the great toe joints.    OTC NSAID therapy and acetaminophen can also be of some value during acute flareups.  He will monitor for any acute gouty flareups.  He can follow-up with me on an as-needed basis.       Diagnoses and all orders for this visit:    Hallux limitus, acquired, unspecified laterality  -     X-ray foot right 3+ views; Future  -     X-ray foot left 3+ views; Future    Pain in both feet  -     Ambulatory Referral to Podiatry  -     X-ray foot right 3+ views; Future  -     X-ray foot left 3+ views; Future          Subjective:     Patient ID: Marques Romero is a 38 y.o. male.    The patient presents today for his initial consultation with [facility] podiatry with a  chief complaint of bilateral forefoot pain that has been present for several months.  Patient also notes several recent acute gouty flareups in the great toe joints.  The gouty process has resolved however recently he still notes occasional tenderness to the balls of the feet.  This tends to occur seemingly on a daily basis.  Pain and swelling can sometimes be worse at nighttime when he comes out of his shoes.  There has been no history of any recent injury or trauma.  He has made modifications to his diet secondary to his history of gouty arthropathy.      PAST MEDICAL HISTORY:  Past Medical History:   Diagnosis Date    Arthritis     Gout       PAST SURGICAL HISTORY:  Past Surgical History:   Procedure Laterality Date    ANTERIOR CRUCIATE LIGAMENT REPAIR Right         ALLERGIES:  Patient has no known allergies.    MEDICATIONS:  Current Outpatient Medications   Medication Sig Dispense Refill    B Complex Vitamins (B COMPLEX 1 PO) Take by mouth With Vitamin C      Cholecalciferol (VITAMIN D3 PO) Take by mouth in the morning      Cyanocobalamin (VITAMIN B 12 PO) Take by mouth in the morning      Turmeric (QC TUMERIC COMPLEX PO) Take by mouth daily      allopurinol (ZYLOPRIM) 100 mg tablet TAKE 1 TABLET BY MOUTH EVERY DAY (Patient not taking: Reported on 4/10/2024) 90 tablet 1    colchicine (COLCRYS) 0.6 mg tablet Take 1 tablet (0.6 mg total) by mouth daily (Patient not taking: Reported on 3/12/2024) 20 tablet 0    indomethacin (INDOCIN) 50 mg capsule Take 1 capsule (50 mg total) by mouth 3 (three) times a day with meals (Patient not taking: Reported on 3/12/2024) 20 capsule 0     No current facility-administered medications for this visit.       SOCIAL HISTORY:  Social History     Socioeconomic History    Marital status: /Civil Union     Spouse name: None    Number of children: None    Years of education: None    Highest education level: None   Occupational History    None   Tobacco Use    Smoking status: Never     Smokeless tobacco: Never   Vaping Use    Vaping status: Never Used   Substance and Sexual Activity    Alcohol use: Never    Drug use: Never    Sexual activity: Not Currently     Partners: Female     Birth control/protection: Condom Male   Other Topics Concern    None   Social History Narrative    None     Social Determinants of Health     Financial Resource Strain: Not on file   Food Insecurity: Not on file   Transportation Needs: Not on file   Physical Activity: Not on file   Stress: Not on file   Social Connections: Not on file   Intimate Partner Violence: Not on file   Housing Stability: Not on file        Review of Systems   Constitutional: Negative.    HENT: Negative.     Eyes: Negative.    Respiratory: Negative.     Cardiovascular: Negative.    Endocrine: Negative.    Musculoskeletal: Negative.    Neurological: Negative.    Hematological: Negative.    Psychiatric/Behavioral: Negative.           Objective:     Physical Exam  Vitals reviewed.   Constitutional:       Appearance: Normal appearance.   HENT:      Head: Normocephalic and atraumatic.      Nose: Nose normal.   Eyes:      Conjunctiva/sclera: Conjunctivae normal.      Pupils: Pupils are equal, round, and reactive to light.   Cardiovascular:      Pulses:           Dorsalis pedis pulses are 2+ on the right side and 2+ on the left side.        Posterior tibial pulses are 2+ on the right side and 2+ on the left side.   Pulmonary:      Effort: Pulmonary effort is normal.   Musculoskeletal:      Right foot: Decreased range of motion. Bunion present.      Left foot: Decreased range of motion. Bunion present.   Feet:      Right foot:      Skin integrity: Skin integrity normal.      Left foot:      Skin integrity: Skin integrity normal.      Comments: The patient's clinical examination today significant for passive range of motion of the great toe joints bilaterally.  Passive range of motion is approximately 20 to 25 degrees of dorsiflexion.  There is no  active erythema nor edema no counter ecchymosis to either foot today.  Pulses are palpable bilaterally.  Osseous spurring is also noted with palpation of the bilateral great toe joints.    Skin:     General: Skin is warm.      Capillary Refill: Capillary refill takes less than 2 seconds.   Neurological:      General: No focal deficit present.      Mental Status: He is alert and oriented to person, place, and time.   Psychiatric:         Mood and Affect: Mood normal.         Behavior: Behavior normal.         Thought Content: Thought content normal.

## 2024-04-10 NOTE — PATIENT INSTRUCTIONS
Recommend quality over the counter arch supports:    - Powerstep Treadwell series insoles  - Spenco Orthotic Arch insoles  - Superfeet insoles   - PCSsole- 3/4 length insoles